# Patient Record
Sex: FEMALE | Race: WHITE
[De-identification: names, ages, dates, MRNs, and addresses within clinical notes are randomized per-mention and may not be internally consistent; named-entity substitution may affect disease eponyms.]

---

## 2018-05-04 ENCOUNTER — HOSPITAL ENCOUNTER (OUTPATIENT)
Dept: HOSPITAL 95 - ORSCSDS | Age: 38
Discharge: HOME | End: 2018-05-04
Attending: OBSTETRICS & GYNECOLOGY
Payer: COMMERCIAL

## 2018-05-04 VITALS — BODY MASS INDEX: 25.83 KG/M2 | HEIGHT: 62.01 IN | WEIGHT: 142.13 LBS

## 2018-05-04 DIAGNOSIS — F41.8: ICD-10-CM

## 2018-05-04 DIAGNOSIS — E78.5: ICD-10-CM

## 2018-05-04 DIAGNOSIS — Z79.899: ICD-10-CM

## 2018-05-04 DIAGNOSIS — I10: ICD-10-CM

## 2018-05-04 DIAGNOSIS — F17.210: ICD-10-CM

## 2018-05-04 DIAGNOSIS — E05.00: ICD-10-CM

## 2018-05-04 DIAGNOSIS — R10.2: ICD-10-CM

## 2018-05-04 DIAGNOSIS — N92.1: ICD-10-CM

## 2018-05-04 DIAGNOSIS — Z30.2: Primary | ICD-10-CM

## 2018-05-04 PROCEDURE — 0U5B8ZZ DESTRUCTION OF ENDOMETRIUM, VIA NATURAL OR ARTIFICIAL OPENING ENDOSCOPIC: ICD-10-PCS | Performed by: OBSTETRICS & GYNECOLOGY

## 2018-05-04 PROCEDURE — 0UB74ZZ EXCISION OF BILATERAL FALLOPIAN TUBES, PERCUTANEOUS ENDOSCOPIC APPROACH: ICD-10-PCS | Performed by: OBSTETRICS & GYNECOLOGY

## 2018-05-04 PROCEDURE — 0UDB8ZX EXTRACTION OF ENDOMETRIUM, VIA NATURAL OR ARTIFICIAL OPENING ENDOSCOPIC, DIAGNOSTIC: ICD-10-PCS | Performed by: OBSTETRICS & GYNECOLOGY

## 2018-06-14 ENCOUNTER — HOSPITAL ENCOUNTER (OUTPATIENT)
Dept: HOSPITAL 95 - LAB | Age: 38
End: 2018-06-14
Attending: ADVANCED PRACTICE MIDWIFE
Payer: COMMERCIAL

## 2018-06-14 DIAGNOSIS — B37.9: Primary | ICD-10-CM

## 2018-07-16 ENCOUNTER — HOSPITAL ENCOUNTER (OUTPATIENT)
Dept: HOSPITAL 95 - LAB EV | Age: 38
Discharge: HOME | End: 2018-07-16
Attending: PHYSICIAN ASSISTANT
Payer: COMMERCIAL

## 2018-07-16 DIAGNOSIS — F41.9: Primary | ICD-10-CM

## 2019-02-14 ENCOUNTER — HOSPITAL ENCOUNTER (EMERGENCY)
Dept: HOSPITAL 95 - ER | Age: 39
Discharge: HOME | End: 2019-02-14
Payer: COMMERCIAL

## 2019-02-14 VITALS — WEIGHT: 145 LBS | BODY MASS INDEX: 26.68 KG/M2 | HEIGHT: 62 IN

## 2019-02-14 DIAGNOSIS — S86.112A: Primary | ICD-10-CM

## 2019-02-14 DIAGNOSIS — Z88.0: ICD-10-CM
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
## 2019-02-14 DIAGNOSIS — G43.909: ICD-10-CM

## 2019-02-14 DIAGNOSIS — F32.9: ICD-10-CM

## 2019-02-14 DIAGNOSIS — Z79.899: ICD-10-CM

## 2019-02-14 DIAGNOSIS — F17.210: ICD-10-CM

## 2019-02-14 DIAGNOSIS — F41.9: ICD-10-CM

## 2019-02-14 DIAGNOSIS — I10: ICD-10-CM

## 2019-11-27 ENCOUNTER — HOSPITAL ENCOUNTER (OUTPATIENT)
Dept: HOSPITAL 95 - LAB | Age: 39
End: 2019-11-27
Attending: ADVANCED PRACTICE MIDWIFE
Payer: COMMERCIAL

## 2019-11-27 DIAGNOSIS — Z20.2: ICD-10-CM

## 2019-11-27 DIAGNOSIS — N89.8: Primary | ICD-10-CM

## 2021-06-29 ENCOUNTER — HOSPITAL ENCOUNTER (OUTPATIENT)
Dept: HOSPITAL 95 - LAB SHORT | Age: 41
End: 2021-06-29
Attending: ADVANCED PRACTICE MIDWIFE
Payer: COMMERCIAL

## 2021-06-29 DIAGNOSIS — Z88.0: ICD-10-CM

## 2021-06-29 DIAGNOSIS — R87.610: Primary | ICD-10-CM

## 2021-06-29 DIAGNOSIS — D26.0: ICD-10-CM

## 2021-11-12 ENCOUNTER — HOSPITAL ENCOUNTER (OUTPATIENT)
Dept: HOSPITAL 95 - LAB SHORT | Age: 41
End: 2021-11-12
Attending: ADVANCED PRACTICE MIDWIFE
Payer: COMMERCIAL

## 2021-11-12 DIAGNOSIS — Z20.2: Primary | ICD-10-CM

## 2022-07-06 ENCOUNTER — HOSPITAL ENCOUNTER (OUTPATIENT)
Dept: HOSPITAL 95 - LAB | Age: 42
End: 2022-07-06
Attending: ADVANCED PRACTICE MIDWIFE
Payer: COMMERCIAL

## 2022-07-06 DIAGNOSIS — Z12.4: Primary | ICD-10-CM

## 2022-07-06 PROCEDURE — G0123 SCREEN CERV/VAG THIN LAYER: HCPCS

## 2022-07-08 LAB — OTHER STN SPEC: (no result)

## 2022-07-29 ENCOUNTER — HOSPITAL ENCOUNTER (OUTPATIENT)
Dept: HOSPITAL 95 - LAB SHORT | Age: 42
Discharge: HOME | End: 2022-07-29
Attending: FAMILY MEDICINE
Payer: COMMERCIAL

## 2022-07-29 DIAGNOSIS — E86.0: Primary | ICD-10-CM

## 2022-07-29 LAB
ALBUMIN SERPL BCP-MCNC: 4.2 G/DL (ref 3.4–5)
ALBUMIN/GLOB SERPL: 1.3 {RATIO} (ref 0.8–1.8)
ALT SERPL W P-5'-P-CCNC: 25 U/L (ref 12–78)
ANION GAP SERPL CALCULATED.4IONS-SCNC: 5 MMOL/L (ref 6–16)
AST SERPL W P-5'-P-CCNC: 21 U/L (ref 12–37)
BASOPHILS # BLD AUTO: 0.02 K/MM3 (ref 0–0.23)
BASOPHILS NFR BLD AUTO: 0 % (ref 0–2)
BILIRUB SERPL-MCNC: 0.8 MG/DL (ref 0.1–1)
BUN SERPL-MCNC: 18 MG/DL (ref 8–24)
CALCIUM SERPL-MCNC: 9 MG/DL (ref 8.5–10.1)
CHLORIDE SERPL-SCNC: 102 MMOL/L (ref 98–108)
CO2 SERPL-SCNC: 31 MMOL/L (ref 21–32)
CREAT SERPL-MCNC: 1.11 MG/DL (ref 0.4–1)
DEPRECATED RDW RBC AUTO: 47.1 FL (ref 35.1–46.3)
EOSINOPHIL # BLD AUTO: 0.12 K/MM3 (ref 0–0.68)
EOSINOPHIL NFR BLD AUTO: 2 % (ref 0–6)
ERYTHROCYTE [DISTWIDTH] IN BLOOD BY AUTOMATED COUNT: 12.5 % (ref 11.7–14.2)
GLOBULIN SER CALC-MCNC: 3.2 G/DL (ref 2.2–4)
GLUCOSE SERPL-MCNC: 88 MG/DL (ref 70–99)
HCT VFR BLD AUTO: 41 % (ref 33–51)
HGB BLD-MCNC: 14.1 G/DL (ref 11.5–16)
IMM GRANULOCYTES # BLD AUTO: 0.04 K/MM3 (ref 0–0.1)
IMM GRANULOCYTES NFR BLD AUTO: 1 % (ref 0–1)
LYMPHOCYTES # BLD AUTO: 2.3 K/MM3 (ref 0.84–5.2)
LYMPHOCYTES NFR BLD AUTO: 28 % (ref 21–46)
MCHC RBC AUTO-ENTMCNC: 34.4 G/DL (ref 31.5–36.5)
MCV RBC AUTO: 103 FL (ref 80–100)
MONOCYTES # BLD AUTO: 0.54 K/MM3 (ref 0.16–1.47)
MONOCYTES NFR BLD AUTO: 7 % (ref 4–13)
NEUTROPHILS # BLD AUTO: 5.14 K/MM3 (ref 1.96–9.15)
NEUTROPHILS NFR BLD AUTO: 63 % (ref 41–73)
NRBC # BLD AUTO: 0 K/MM3 (ref 0–0.02)
NRBC BLD AUTO-RTO: 0 /100 WBC (ref 0–0.2)
PLATELET # BLD AUTO: 223 K/MM3 (ref 150–400)
POTASSIUM SERPL-SCNC: 4 MMOL/L (ref 3.5–5.5)
PROT SERPL-MCNC: 7.4 G/DL (ref 6.4–8.2)
SODIUM SERPL-SCNC: 138 MMOL/L (ref 136–145)

## 2022-10-20 ENCOUNTER — HOSPITAL ENCOUNTER (OUTPATIENT)
Dept: HOSPITAL 95 - LAB SHORT | Age: 42
End: 2022-10-20
Attending: ADVANCED PRACTICE MIDWIFE
Payer: COMMERCIAL

## 2022-10-20 DIAGNOSIS — Z20.2: ICD-10-CM

## 2022-10-20 DIAGNOSIS — Z11.3: Primary | ICD-10-CM

## 2022-11-09 ENCOUNTER — HOSPITAL ENCOUNTER (OUTPATIENT)
Dept: HOSPITAL 95 - LAB | Age: 42
Discharge: HOME | End: 2022-11-09
Attending: ADVANCED PRACTICE MIDWIFE
Payer: COMMERCIAL

## 2022-11-09 DIAGNOSIS — N95.1: Primary | ICD-10-CM

## 2022-11-09 LAB — FSH SERPL-ACNC: 7.4 MIU/ML

## 2022-12-22 ENCOUNTER — HOSPITAL ENCOUNTER (OUTPATIENT)
Dept: HOSPITAL 95 - LAB | Age: 42
Discharge: HOME | End: 2022-12-22
Attending: ADVANCED PRACTICE MIDWIFE
Payer: COMMERCIAL

## 2022-12-22 DIAGNOSIS — L29.3: Primary | ICD-10-CM

## 2023-01-10 ENCOUNTER — HOSPITAL ENCOUNTER (OUTPATIENT)
Dept: HOSPITAL 95 - LAB SHORT | Age: 43
Discharge: HOME | End: 2023-01-10
Attending: PHYSICIAN ASSISTANT
Payer: COMMERCIAL

## 2023-01-10 DIAGNOSIS — L03.211: Primary | ICD-10-CM

## 2023-01-10 LAB
ALBUMIN SERPL BCP-MCNC: 4.4 G/DL (ref 3.4–5)
ALBUMIN/GLOB SERPL: 1 {RATIO} (ref 0.8–1.8)
ALT SERPL W P-5'-P-CCNC: 94 U/L (ref 12–78)
ANION GAP SERPL CALCULATED.4IONS-SCNC: 7 MMOL/L (ref 6–16)
AST SERPL W P-5'-P-CCNC: 78 U/L (ref 12–37)
BASOPHILS # BLD AUTO: 0.04 K/MM3 (ref 0–0.23)
BASOPHILS NFR BLD AUTO: 1 % (ref 0–2)
BILIRUB SERPL-MCNC: 0.4 MG/DL (ref 0.1–1)
BUN SERPL-MCNC: 7 MG/DL (ref 8–24)
CALCIUM SERPL-MCNC: 9.9 MG/DL (ref 8.5–10.1)
CHLORIDE SERPL-SCNC: 100 MMOL/L (ref 98–108)
CO2 SERPL-SCNC: 31 MMOL/L (ref 21–32)
CREAT SERPL-MCNC: 0.66 MG/DL (ref 0.4–1)
DEPRECATED RDW RBC AUTO: 49.5 FL (ref 35.1–46.3)
EOSINOPHIL # BLD AUTO: 0.15 K/MM3 (ref 0–0.68)
EOSINOPHIL NFR BLD AUTO: 2 % (ref 0–6)
ERYTHROCYTE [DISTWIDTH] IN BLOOD BY AUTOMATED COUNT: 12.9 % (ref 11.7–14.2)
GLOBULIN SER CALC-MCNC: 4.6 G/DL (ref 2.2–4)
GLUCOSE SERPL-MCNC: 83 MG/DL (ref 70–99)
HCT VFR BLD AUTO: 47.7 % (ref 33–51)
HGB BLD-MCNC: 15.8 G/DL (ref 11.5–16)
IMM GRANULOCYTES # BLD AUTO: 0.09 K/MM3 (ref 0–0.1)
IMM GRANULOCYTES NFR BLD AUTO: 1 % (ref 0–1)
LYMPHOCYTES # BLD AUTO: 1.98 K/MM3 (ref 0.84–5.2)
LYMPHOCYTES NFR BLD AUTO: 27 % (ref 21–46)
MCHC RBC AUTO-ENTMCNC: 33.1 G/DL (ref 31.5–36.5)
MCV RBC AUTO: 104 FL (ref 80–100)
MONOCYTES # BLD AUTO: 0.5 K/MM3 (ref 0.16–1.47)
MONOCYTES NFR BLD AUTO: 7 % (ref 4–13)
NEUTROPHILS # BLD AUTO: 4.7 K/MM3 (ref 1.96–9.15)
NEUTROPHILS NFR BLD AUTO: 63 % (ref 41–73)
NRBC # BLD AUTO: 0 K/MM3 (ref 0–0.02)
NRBC BLD AUTO-RTO: 0 /100 WBC (ref 0–0.2)
PLATELET # BLD AUTO: 303 K/MM3 (ref 150–400)
POTASSIUM SERPL-SCNC: 4.3 MMOL/L (ref 3.5–5.5)
PROT SERPL-MCNC: 9 G/DL (ref 6.4–8.2)
SODIUM SERPL-SCNC: 138 MMOL/L (ref 136–145)

## 2023-05-11 ENCOUNTER — HOSPITAL ENCOUNTER (EMERGENCY)
Dept: HOSPITAL 95 - ER | Age: 43
Discharge: HOME | End: 2023-05-11
Payer: COMMERCIAL

## 2023-05-11 VITALS — WEIGHT: 138.01 LBS | BODY MASS INDEX: 25.4 KG/M2 | HEIGHT: 62 IN

## 2023-05-11 VITALS — SYSTOLIC BLOOD PRESSURE: 119 MMHG | DIASTOLIC BLOOD PRESSURE: 76 MMHG

## 2023-05-11 DIAGNOSIS — I82.812: Primary | ICD-10-CM

## 2023-05-11 DIAGNOSIS — J45.909: ICD-10-CM

## 2023-05-11 DIAGNOSIS — G43.909: ICD-10-CM

## 2023-05-11 DIAGNOSIS — I10: ICD-10-CM

## 2023-05-11 DIAGNOSIS — Z88.0: ICD-10-CM

## 2023-05-11 DIAGNOSIS — F17.210: ICD-10-CM

## 2023-05-11 DIAGNOSIS — Z79.899: ICD-10-CM

## 2023-08-01 ENCOUNTER — HOSPITAL ENCOUNTER (OUTPATIENT)
Dept: HOSPITAL 95 - MHTC | Age: 43
Discharge: HOME | End: 2023-08-01
Attending: RADIOLOGY
Payer: COMMERCIAL

## 2023-08-01 VITALS — SYSTOLIC BLOOD PRESSURE: 104 MMHG | DIASTOLIC BLOOD PRESSURE: 78 MMHG

## 2023-08-01 VITALS — SYSTOLIC BLOOD PRESSURE: 110 MMHG | DIASTOLIC BLOOD PRESSURE: 86 MMHG

## 2023-08-01 VITALS — SYSTOLIC BLOOD PRESSURE: 121 MMHG | DIASTOLIC BLOOD PRESSURE: 89 MMHG

## 2023-08-01 VITALS — DIASTOLIC BLOOD PRESSURE: 76 MMHG | SYSTOLIC BLOOD PRESSURE: 114 MMHG

## 2023-08-01 VITALS — BODY MASS INDEX: 25.4 KG/M2 | WEIGHT: 138.01 LBS | HEIGHT: 62 IN

## 2023-08-01 VITALS — SYSTOLIC BLOOD PRESSURE: 107 MMHG | DIASTOLIC BLOOD PRESSURE: 74 MMHG

## 2023-08-01 VITALS — SYSTOLIC BLOOD PRESSURE: 140 MMHG | DIASTOLIC BLOOD PRESSURE: 110 MMHG

## 2023-08-01 VITALS — SYSTOLIC BLOOD PRESSURE: 146 MMHG | DIASTOLIC BLOOD PRESSURE: 99 MMHG

## 2023-08-01 DIAGNOSIS — Z79.899: ICD-10-CM

## 2023-08-01 DIAGNOSIS — I87.2: ICD-10-CM

## 2023-08-01 DIAGNOSIS — F17.210: ICD-10-CM

## 2023-08-01 DIAGNOSIS — I86.2: Primary | ICD-10-CM

## 2023-08-01 DIAGNOSIS — Z88.0: ICD-10-CM

## 2023-08-01 PROCEDURE — C1769 GUIDE WIRE: HCPCS

## 2023-08-01 PROCEDURE — C1894 INTRO/SHEATH, NON-LASER: HCPCS

## 2023-08-01 PROCEDURE — C1887 CATHETER, GUIDING: HCPCS

## 2023-08-01 NOTE — NUR
PATIENT RETURNED FROM THE CATH LAB WITH A RFV SITE. HEMOSTASIS AT 1142 FROM A
MANUAL HOLD.PATIENT PLACED ON THE MONITOR AND SUPINE IN THE BED. FAMILY AT [facility]
BEDSIDE AND CALL LIGHT IN REACH. NO BLEEDING OR HEMATOMA AT THE RFV SITE.
LUNCH TRAY SERVED AND FAMILY HELPING WITH MEAL. NO PAIN NOTED. TAKIGN MEAL
WITHOUT DIFFICULTY OR NAUSEA NOTED.

## 2023-08-01 NOTE — NUR
PATIENT AMBULATING TO RESTROOM WITHOUT DIFFICULTY. R VEIN GROIN SITE C/D/I
SOFT/NONTENDER, NO EVIDNECE OF HEMATOMA. VSS ON RA. DISHARGE INSTRUCTIONS
REVIEWED WITH PATIENT. ALL QUESTIONS WERE ANSWERED. PATIENT DENYING ANY PAIN.
PIV REMOVED WITHOUT DIFFICULTY, CATHETER INTACT.

## 2023-08-01 NOTE — NUR
PATIENT SITTING UPRIGHT IN BED. R GROIN SITE C/D/I SOFT/NONTENDER, NO EVIDENCE
OF HEMATOMA. PATIENT TOLERATING PO INTAKE WELL. VSS ON RA

## 2023-08-01 NOTE — NUR
PATIENT DISCHARGED HOME AT THIS TIME. R GROIN SITE C/D/I SOFT/NONTENDER, NO
EVIDENCE OF HEMATOMA. VSS ON RA. ALL PATIENT BELONGINGS AND PAPERWORK LEFT
WITH PATIENT.
 
PATIENT WHEELED TO HOSPITAL ENTRANCE AND FRIEND ABLE TO PROVIDE TRANSPORTATION
HOME.

## 2023-10-25 ENCOUNTER — HOSPITAL ENCOUNTER (OUTPATIENT)
Dept: HOSPITAL 95 - LAB SHORT | Age: 43
End: 2023-10-25
Attending: ADVANCED PRACTICE MIDWIFE
Payer: COMMERCIAL

## 2023-10-25 DIAGNOSIS — T36.8X5S: ICD-10-CM

## 2023-10-25 DIAGNOSIS — N89.8: ICD-10-CM

## 2023-10-25 DIAGNOSIS — Z12.4: Primary | ICD-10-CM

## 2023-10-25 PROCEDURE — G0145 SCR C/V CYTO,THINLAYER,RESCR: HCPCS

## 2023-10-27 LAB — OTHER STN SPEC: (no result)
